# Patient Record
Sex: MALE | Race: WHITE | NOT HISPANIC OR LATINO | Employment: FULL TIME
[De-identification: names, ages, dates, MRNs, and addresses within clinical notes are randomized per-mention and may not be internally consistent; named-entity substitution may affect disease eponyms.]

---

## 2019-11-05 ENCOUNTER — HEALTH MAINTENANCE LETTER (OUTPATIENT)
Age: 29
End: 2019-11-05

## 2020-01-23 ENCOUNTER — VIRTUAL VISIT (OUTPATIENT)
Dept: FAMILY MEDICINE | Facility: OTHER | Age: 30
End: 2020-01-23

## 2020-01-23 NOTE — PROGRESS NOTES
"Date: 2020 15:01:08  Clinician: Daniel Scott  Clinician NPI: [id number]  Patient: Claudy Angulo  Patient : 1990  Patient Address: [address] [place], [place] [address] [address]  Patient Phone: [phone]  Visit Protocol: URI  Patient Summary:  Claudy is a 29 year old ( : 1990 ) male who initiated a Visit for cold, sinus infection, or influenza. When asked the question \"Please sign me up to receive news, health information and promotions from [employer].\", Claudy responded \"No\".    Claudy states his symptoms started suddenly 7-9 days ago.   His symptoms consist of a headache, ear pain, nasal congestion, malaise, rhinitis, a cough, facial pain or pressure, and myalgia. He is experiencing difficulty breathing due to nasal congestion but he is not short of breath.   Symptom details     Nasal secretions: The color of his mucus is green and yellow.    Cough: Claudy coughs a few times an hour and his cough is more bothersome at night. Phlegm comes into his throat when he coughs. He believes the phlegm causes the cough. The color of the phlegm is green and yellow.     Facial pain or pressure: The facial pain or pressure feels worse when bending over or leaning forward.     Headache: He states the headache is moderate (4-6 on a 10 point pain scale).      Claudy denies having sore throat, chills, fever, wheezing, and teeth pain. He also denies double sickening (worsening symptoms after initial improvement), taking antibiotic medication for the symptoms, having recent facial or sinus surgery in the past 60 days, and having a sinus infection within the past year.   Precipitating events  He has not recently been exposed to someone with influenza. Claudy has been in close contact with the following high risk individuals: adults 65 or older, pregnant women, and children under the age of 5.   Pertinent medical history  Weight: 215 lbs   Claudy does not smoke or use smokeless tobacco.   Weight: 215 lbs    MEDICATIONS: No " current medications, ALLERGIES: NKDA  Clinician Response:  Dear Claudy,  Based on the information provided, you have acute bacterial sinusitis, also known as a sinus infection. Sinus infections are caused by bacteria or a virus and symptoms are almost always identical. The difference between the 2 types of infections is timing.  Sinus infections start as viral infections and symptoms improve on their own in about 7 days. If symptoms have not improved after 7 days or have even worsened, a bacterial infection may have developed.  Medication information  I am prescribing:     Amoxicillin 500 mg oral tablet. Take 1 tablet by mouth every 8 hours for 7 days. There are no refills with this prescription.   Self care  The following tips will keep you as comfortable as possible while you recover:     Rest    Drink plenty of water and other liquids    Take a hot shower to loosen congestion    Take a spoonful of honey to reduce your cough     When to seek care  Please be seen in a clinic or urgent care if any of the following occur:     Symptoms do not start to improve after 3 days of treatment    New symptoms develop, or symptoms become worse     It is possible to have an allergic reaction to an antibiotic even if you have not had one in the past. If you notice a new rash, significant swelling, or difficulty breathing, stop taking this medication immediately and go to a clinic or urgent care.   Diagnosis: Acute bacterial sinusitis  Diagnosis ICD: J01.90  Prescription: amoxicillin 500 mg oral tablet 21 tablet, 7 days supply. Take 1 tablet by mouth every 8 hours for 7 days. Refills: 0, Refill as needed: no, Allow substitutions: yes  Pharmacy: [facility] - [phone] - [address] [facility] [place] [address]

## 2020-08-27 ENCOUNTER — TELEPHONE (OUTPATIENT)
Dept: OPTOMETRY | Facility: CLINIC | Age: 30
End: 2020-08-27

## 2020-08-27 ENCOUNTER — OFFICE VISIT (OUTPATIENT)
Dept: OPTOMETRY | Facility: CLINIC | Age: 30
End: 2020-08-27
Payer: COMMERCIAL

## 2020-08-27 DIAGNOSIS — H52.203 MYOPIA OF BOTH EYES WITH ASTIGMATISM: Primary | ICD-10-CM

## 2020-08-27 DIAGNOSIS — H04.129 DRY EYE: ICD-10-CM

## 2020-08-27 DIAGNOSIS — H52.13 MYOPIA OF BOTH EYES WITH ASTIGMATISM: Primary | ICD-10-CM

## 2020-08-27 PROCEDURE — 92004 COMPRE OPH EXAM NEW PT 1/>: CPT | Performed by: OPTOMETRIST

## 2020-08-27 PROCEDURE — 92310 CONTACT LENS FITTING OU: CPT | Performed by: OPTOMETRIST

## 2020-08-27 PROCEDURE — 92015 DETERMINE REFRACTIVE STATE: CPT | Performed by: OPTOMETRIST

## 2020-08-27 ASSESSMENT — REFRACTION_CURRENTRX
OS_SPHERE: -0.75
OS_AXIS: 090
OD_AXIS: 090
OS_BRAND: J&J ACUVUE OASYS 1-DAY FOR ASTIGMATISM
OS_CYLINDER: -0.75
OD_DIAMETER: 14.3
OS_DIAMETER: 14.3
OD_SPHERE: -0.50
OD_CYLINDER: -0.75
OD_BASECURVE: 8.5
OS_BASECURVE: 8.5
OD_BRAND: J&J ACUVUE OASYS 1-DAY FOR ASTIGMATISM

## 2020-08-27 ASSESSMENT — KERATOMETRY
OD_AXISANGLE2_DEGREES: 118
OS_K2POWER_DIOPTERS: 44.87
OS_AXISANGLE_DEGREES: 3
OS_AXISANGLE2_DEGREES: 93
OD_K2POWER_DIOPTERS: 45.25
OD_K1POWER_DIOPTERS: 44.75
OD_AXISANGLE_DEGREES: 28
METHOD_AUTO_MANUAL: AUTOMATED
OS_K1POWER_DIOPTERS: 44.62

## 2020-08-27 ASSESSMENT — REFRACTION_MANIFEST
OD_CYLINDER: +0.75
OD_CYLINDER: +0.75
OS_AXIS: 177
OD_SPHERE: -1.25
OS_SPHERE: -2.00
OD_AXIS: 004
OS_CYLINDER: +0.75
OS_SPHERE: -2.75
OS_AXIS: 165
OD_SPHERE: -1.75
OS_CYLINDER: +1.00
OD_AXIS: 175
METHOD_AUTOREFRACTION: 1

## 2020-08-27 ASSESSMENT — VISUAL ACUITY
OS_SC+: -2
OS_CC: 20/25
OD_CC: 20/20
CORRECTION_TYPE: CONTACTS
OD_SC+: -2
OS_SC: 20/20
OS_CC: 20/20
OD_SC: 20/40
METHOD: SNELLEN - LINEAR
OD_CC: 20/20
OS_CC+: -2

## 2020-08-27 ASSESSMENT — REFRACTION: OS_SPHERE: -1.50

## 2020-08-27 ASSESSMENT — CUP TO DISC RATIO
OD_RATIO: 0.3
OS_RATIO: 0.3

## 2020-08-27 ASSESSMENT — CONF VISUAL FIELD
METHOD: COUNTING FINGERS
OD_NORMAL: 1
OS_NORMAL: 1

## 2020-08-27 ASSESSMENT — EXTERNAL EXAM - LEFT EYE: OS_EXAM: NORMAL

## 2020-08-27 ASSESSMENT — TONOMETRY
IOP_METHOD: APPLANATION
OD_IOP_MMHG: 16
OS_IOP_MMHG: 16

## 2020-08-27 ASSESSMENT — SLIT LAMP EXAM - LIDS
COMMENTS: MEIBOMIAN GLAND DYSFUNCTION
COMMENTS: MEIBOMIAN GLAND DYSFUNCTION

## 2020-08-27 ASSESSMENT — EXTERNAL EXAM - RIGHT EYE: OD_EXAM: NORMAL

## 2020-08-27 NOTE — PROGRESS NOTES
Chief Complaint   Patient presents with     Annual Eye Exam     Contact Lens Evaluation     MARQUIS: 1.5 years wants daily lenses sleeps in lenses   Blur at distance.  Wears cls. Has never found a pair of glasses that he likes.   He reports his eyes hurt at the end of the day wearing cls.   He gets HA's recently     Previous contact lens wearer? Yes: unsure of exact brand- sultana daily lens  Comfort of contact lenses :Not as good as acuvue  Satisfied with current lenses: No        Last Eye Exam: 2019  Dilated Previously: Yes    What are you currently using to see?  glasses and contacts    Distance Vision Acuity: Noticed gradual change in both eyes    Near Vision Acuity: Satisfied with vision while reading and using computer unaided    Eye Comfort: good  Do you use eye drops? : No  Occupation or Hobbies:       Kayy Thornton CPO     Medical, surgical and family histories reviewed and updated 8/27/2020.       OBJECTIVE: See Ophthalmology exam    ASSESSMENT:    ICD-10-CM    1. Myopia of both eyes with astigmatism  H52.13 CONTACT LENS FITTING,BILAT    H52.203         PLAN:   Order trials for contact lens fitting  Prescription for glasses   Artificial tears          Savannah Mandel OD

## 2020-08-27 NOTE — TELEPHONE ENCOUNTER
Brand  Base Curve  Diameter  Sphere  Cylinder  [place]  Dist VA  Near VA    Right  J&J Acuvue Oasys 1-day for astigmatism  8.5  14.3  -0.50  -0.75  090      Left  J&J Acuvue Oasys 1-day for astigmatism  8.5  14.3  -0.75  -0.75  090        Ordered trials for dispense appointment.    Kayy Thornton on 8/27/2020 at 10:50 AM

## 2020-08-27 NOTE — PATIENT INSTRUCTIONS
We will order trials and set up an appointment to check them/ make any adjustments before finalizing contact lens prescription     You may use rewetting drops such as blink or refresh contacts over lenses   and artificial tears when lenses are out    There are over the counter drops that work well and may be used up to 4 x daily when lenses are out if your eyes are dry. ( systane , refresh, thera tears) If you need more than 4 drops daily, use a preservative free product which come in individual vials and may be used for 24 hours until finished and discarded.     If you are in not in dailies, consider clear care solution if still having problems and reduce wear time to 10-12 hours       DRY EYE TREATMENT    I recommend using artificial tears for your dry eye. There are over the counter drops that work well and may be used up to 4x daily. ( systane balance, blink, refresh optive, soothe xp)   If you need more than 4 drops daily, use a preservative free product which come in individual vials and may be used for 24 hours and discarded.   The more severe the dry eye, the more frequent instillation of artificial tears  that are needed to reduce irritation/ inflammation. (Sometimes every 1-2 hours for a couple of days).  You can also add a lubricating ointment in the lower lid at bedtime. ( over the counter refresh pm, genteal gel, lacrilube etc.)    Artificial tears work best as a preventative and not as well after your eyes are starting to bother you and/ or are red.  It may take 4- 6 weeks of using the drops before you notice improvement.  If after that time you are still having problems schedule an appointment for an evaluation to discuss different treatments.    Dry eyes are a chronic condition and you may have more symptoms at certain times of the year.      Additional recommended treatment:  Warm compresses once to twice daily for 5-10 minutes    Directions for warm soaks  There are few methods for hot compresses.  Moisten a washcloth with hot water, or microwave for 10 seconds, being careful to not get the cloth too hot.   Then put the washcloth onto your eyelids for 5 minutes. It will cool quickly so a rice pack or eyemask that can be heated and laid on top of the washcloth will help retain the heat.          Omega 3 fatty acid supplements taken 1-2x daily  Recommend  at least  2000mg omega 3  800 EPA  600 DHA    Blink regularly  Stay hydrated  Increase humidity  Wear sunglasses   Avoid direct exposure to forced air--turn air vents in the car away and keep fans from blowing directly on your face      Recommend no extended wear   Could consider lasik if no change in 2 years

## 2020-08-27 NOTE — LETTER
8/27/2020         RE: Claudy Angulo  [address] [facility] [address]        Dear Colleague,    Thank you for referring your patient, Claudy Angulo, to the [facility]. Please see a copy of my visit note below.    Chief Complaint   Patient presents with     Annual Eye Exam     Contact Lens Evaluation     MARQUIS: 1.5 years wants daily lenses sleeps in lenses   Blur at distance.  Wears cls. Has never found a pair of glasses that he likes.   He reports his eyes hurt at the end of the day wearing cls.   He gets HA's recently     Previous contact lens wearer? Yes: unsure of exact brand- sultana daily lens  Comfort of contact lenses :Not as good as acuvue  Satisfied with current lenses: No        Last Eye Exam: 2019  Dilated Previously: Yes    What are you currently using to see?  glasses and contacts    Distance Vision Acuity: Noticed gradual change in both eyes    Near Vision Acuity: Satisfied with vision while reading and using computer unaided    Eye Comfort: good  Do you use eye drops? : No  Occupation or Hobbies:       Kayy Thornton CPO     Medical, surgical and family histories reviewed and updated 8/27/2020.       OBJECTIVE: See Ophthalmology exam    ASSESSMENT:    ICD-10-CM    1. Myopia of both eyes with astigmatism  H52.13 CONTACT LENS FITTING,BILAT    H52.203         PLAN:   Order trials for contact lens fitting  Prescription for glasses   Artificial tears          Savannah Mandel OD     Again, thank you for allowing me to participate in the care of your patient.        Sincerely,        Savannah Mandel, OD

## 2020-09-08 ENCOUNTER — OFFICE VISIT (OUTPATIENT)
Dept: OPTOMETRY | Facility: CLINIC | Age: 30
End: 2020-09-08
Payer: COMMERCIAL

## 2020-09-08 DIAGNOSIS — H52.13 MYOPIA OF BOTH EYES WITH ASTIGMATISM: Primary | ICD-10-CM

## 2020-09-08 DIAGNOSIS — H52.203 MYOPIA OF BOTH EYES WITH ASTIGMATISM: Primary | ICD-10-CM

## 2020-09-08 PROCEDURE — 92499 UNLISTED OPH SVC/PROCEDURE: CPT | Performed by: OPTOMETRIST

## 2020-09-08 PROCEDURE — 99207 ZZC NO CHARGE LOS: CPT | Performed by: OPTOMETRIST

## 2020-09-08 ASSESSMENT — REFRACTION_CURRENTRX
OS_AXIS: 090
OD_DIAMETER: 14.3
OD_BASECURVE: 8.5
OD_CYLINDER: -0.75
OS_BASECURVE: 8.5
OD_AXIS: 090
OS_DIAMETER: 14.3
OD_SPHERE: -0.50
OS_SPHERE: -0.75
OS_CYLINDER: -0.75

## 2020-09-08 NOTE — PROGRESS NOTES
Chief Complaint   Patient presents with     Dispense      Brand  Base Curve  Diameter  Sphere  Cylinder  [place]  Dist VA  Near VA    Right  J&J Acuvue Oasys 1-day for astigmatism  8.5  14.3  -0.50  -0.75  090  20/20  20/20    Left  J&J Acuvue Oasys 1-day for astigmatism  8.5  14.3  -0.75  -0.75  090  20/25-2  20/20        Contact lenses dispensed    Kayy Thornton CPO      OBJECTIVE: See Ophthalmology exam     ASSESSMENT:    ICD-10-CM    1. Myopia of both eyes with astigmatism  H52.13 CONTACT LENS CHECK    H52.203       PLAN:    Savannah Mandel OD

## 2020-09-08 NOTE — LETTER
9/8/2020         RE: Claudy Angulo  [address] [facility] [facility]  [place] [address]        Dear Colleague,    Thank you for referring your patient, Claudy Angulo, to the [facility]. Please see a copy of my visit note below.    Chief Complaint   Patient presents with     Dispense      Brand  Base Curve  Diameter  Sphere  Cylinder  [place]  Dist VA  Near VA    Right  J&J Acuvue Oasys 1-day for astigmatism  8.5  14.3  -0.50  -0.75  090  20/20  20/20    Left  J&J Acuvue Oasys 1-day for astigmatism  8.5  14.3  -0.75  -0.75  090  20/25-2  20/20        Contact lenses dispensed    Kayy Thornton CPO      OBJECTIVE: See Ophthalmology exam     ASSESSMENT:    ICD-10-CM    1. Myopia of both eyes with astigmatism  H52.13 CONTACT LENS CHECK    H52.203       PLAN:    Savannah Mandel OD     Again, thank you for allowing me to participate in the care of your patient.        Sincerely,        Savannah Mandel, OD

## 2020-09-23 ENCOUNTER — ALLIED HEALTH/NURSE VISIT (OUTPATIENT)
Dept: NURSING | Facility: CLINIC | Age: 30
End: 2020-09-23
Payer: COMMERCIAL

## 2020-09-23 DIAGNOSIS — Z23 NEED FOR PROPHYLACTIC VACCINATION AND INOCULATION AGAINST INFLUENZA: Primary | ICD-10-CM

## 2020-09-23 PROCEDURE — 90686 IIV4 VACC NO PRSV 0.5 ML IM: CPT

## 2020-09-23 PROCEDURE — 90471 IMMUNIZATION ADMIN: CPT

## 2020-11-22 ENCOUNTER — HEALTH MAINTENANCE LETTER (OUTPATIENT)
Age: 30
End: 2020-11-22

## 2021-02-18 ENCOUNTER — OFFICE VISIT (OUTPATIENT)
Dept: PEDIATRICS | Facility: CLINIC | Age: 31
End: 2021-02-18
Payer: COMMERCIAL

## 2021-02-18 VITALS
BODY MASS INDEX: 28.08 KG/M2 | TEMPERATURE: 97.7 F | WEIGHT: 211.9 LBS | HEIGHT: 73 IN | OXYGEN SATURATION: 100 % | DIASTOLIC BLOOD PRESSURE: 78 MMHG | HEART RATE: 71 BPM | SYSTOLIC BLOOD PRESSURE: 108 MMHG

## 2021-02-18 DIAGNOSIS — S89.92XD LEFT KNEE INJURY, SUBSEQUENT ENCOUNTER: ICD-10-CM

## 2021-02-18 DIAGNOSIS — Z00.00 ROUTINE GENERAL MEDICAL EXAMINATION AT A HEALTH CARE FACILITY: Primary | ICD-10-CM

## 2021-02-18 PROCEDURE — 99213 OFFICE O/P EST LOW 20 MIN: CPT | Mod: 25 | Performed by: STUDENT IN AN ORGANIZED HEALTH CARE EDUCATION/TRAINING PROGRAM

## 2021-02-18 PROCEDURE — 99385 PREV VISIT NEW AGE 18-39: CPT | Mod: GC | Performed by: STUDENT IN AN ORGANIZED HEALTH CARE EDUCATION/TRAINING PROGRAM

## 2021-02-18 ASSESSMENT — ENCOUNTER SYMPTOMS
MYALGIAS: 0
EYE PAIN: 0
HEMATOCHEZIA: 0
DIARRHEA: 0
CONSTIPATION: 0
DYSURIA: 0
FEVER: 0
DIZZINESS: 0
PALPITATIONS: 0
FREQUENCY: 0
HEADACHES: 0
JOINT SWELLING: 0
SHORTNESS OF BREATH: 0
ARTHRALGIAS: 1
HEARTBURN: 0
ABDOMINAL PAIN: 0
PARESTHESIAS: 0
NAUSEA: 0
WEAKNESS: 0
COUGH: 0
NERVOUS/ANXIOUS: 0
CHILLS: 0
SORE THROAT: 0
HEMATURIA: 0

## 2021-02-18 ASSESSMENT — MIFFLIN-ST. JEOR: SCORE: 1983.66

## 2021-02-18 NOTE — PATIENT INSTRUCTIONS
1. Follow up with physical therapy.  2. Should knee continue to bother you, then plan to call us to discuss referral to sports medicine or orthopedic surgery.  3. Plan to follow up in 1 year.    Preventive Health Recommendations  Male Ages 26 - 39    Yearly exam:             See your health care provider every year in order to  o   Review health changes.   o   Discuss preventive care.    o   Review your medicines if your doctor has prescribed any.    You should be tested each year for STDs (sexually transmitted diseases), if you re at risk.     After age 35, talk to your provider about cholesterol testing. If you are at risk for heart disease, have your cholesterol tested at least every 5 years.     If you are at risk for diabetes, you should have a diabetes test (fasting glucose).  Shots: Get a flu shot each year. Get a tetanus shot every 10 years.     Nutrition:    Eat at least 5 servings of fruits and vegetables daily.     Eat whole-grain bread, whole-wheat pasta and brown rice instead of white grains and rice.     Get adequate Calcium and Vitamin D.     Lifestyle    Exercise for at least 150 minutes a week (30 minutes a day, 5 days a week). This will help you control your weight and prevent disease.     Limit alcohol to one drink per day.     No smoking.     Wear sunscreen to prevent skin cancer.     See your dentist every six months for an exam and cleaning.

## 2021-02-18 NOTE — PROGRESS NOTES
"SUBJECTIVE:   CC: Mr. Claudy Angulo is a 30-year-old man who presents for preventative health visit.    1. Left knee injury  - Took a helmet to lateral knee (\"from the outside in\") while playing football at age 18.  - On crutches for a couple weeks.  - Reportedly had an X-ray and MRI (at [place]).  - Did not see physical therapy, sports medicine, or orthopedic surgery.  - Has intermittent pain, swelling, and bruising from posterior thigh down calf.  - Pain was last exacerbated by running down a hill after his son who was sledding - experienced \"shocking\" pain after which knee locked up.  - Pain is this bad approximately 3-4x/yr.  - Sitting for a while also locks up knee.  - Consistently tries to be active; went to gym daily for ~5 years; curretly trying to do enough to strengthen his left knee including using a Peloton as of late.  - Amenable to physical therapy.    - Often, \"all joints hurt - hands, feet, ankles, shoulders, wrists.\"  - Seems to worsen every year.  - By the end of day, \"feels like you want to crack your whole body.\"  - Takes ibuprofen every couple of days.    - Denies any depression or anxiety.  - Reports work can sometimes get stressful but able to manage.    - Received influenza vaccine on 09/23/2020  - Received last TDaP on 05/24/2018    -  to wife who works as a nurse here.  - Has 2 kids (Aquilino and Homer, ages 5 months and 2.5 years).  - Works as a .    Patient has been advised of split billing requirements and indicates understanding:Yes  Healthy Habits:     Getting at least 3 servings of Calcium per day:  NO    Bi-annual eye exam:  Yes    Dental care twice a year:  Yes    Sleep apnea or symptoms of sleep apnea:  None    Diet:  Regular (no restrictions)    Frequency of exercise:  2-3 days/week    Duration of exercise:  30-45 minutes    Taking medications regularly:  Yes    Medication side effects:  Not applicable    PHQ-2 Total Score: 0    Diet:  - Breakfast: " "Banana or protein bar  - Lunch: [employer] or salad; [employer] or [employer]  - Dinner: \"Home-made meals\" v. leftovers  - Coffee: 4-5 cups/days  - Rarely drinks an energy drink    Today's PHQ-2 Score:   PHQ-2 ( 1999 Pfizer) 2/18/2021   Q1: Little interest or pleasure in doing things 0   Q2: Feeling down, depressed or hopeless 0   PHQ-2 Score 0   Q1: Little interest or pleasure in doing things Not at all   Q2: Feeling down, depressed or hopeless Not at all   PHQ-2 Score 0     Abuse: Current or Past(Physical, Sexual or Emotional)- No  Do you feel safe in your environment? Yes    Have you ever done Advance Care Planning? (For example, a Health Directive, POLST, or a discussion with a medical provider or your loved ones about your wishes): No, advance care planning information given to patient to review.  Patient declined advance care planning discussion at this time.    Social History     Tobacco Use     Smoking status: Never Smoker     Smokeless tobacco: Never Used   Substance Use Topics     Alcohol use: No   - Never smoker  - No illicit drug use.  - 2 bottles of beer (or drinks of whiskey)/wk    If you drink alcohol do you typically have >3 drinks per day or >7 drinks per week? No    Alcohol Use 2/18/2021   Prescreen: >3 drinks/day or >7 drinks/week? No   Prescreen: >3 drinks/day or >7 drinks/week? -     Last PSA: No results found for: PSA    Reviewed orders with patient. Reviewed health maintenance and updated orders accordingly - Yes    Reviewed and updated as needed this visit by clinical staff  Tobacco  Allergies    Med Hx  Surg Hx  Fam Hx  Soc Hx        Family History  - Father and paternal grandmother have type 1 diabetes mellitus.  - No family history of hypertension or thyroid disease.    Reviewed and updated as needed this visit by Provider                History reviewed. No pertinent past medical history.   History reviewed. No pertinent surgical history.    Review of Systems   Constitutional: Negative " "for chills and fever.   HENT: Negative for congestion, ear pain, hearing loss and sore throat.    Eyes: Negative for pain and visual disturbance.   Respiratory: Negative for cough and shortness of breath.    Cardiovascular: Negative for chest pain, palpitations and peripheral edema.   Gastrointestinal: Negative for abdominal pain, constipation, diarrhea, heartburn, hematochezia and nausea.   Genitourinary: Negative for discharge, dysuria, frequency, genital sores, hematuria, impotence and urgency.   Musculoskeletal: Positive for arthralgias. Negative for joint swelling and myalgias.   Skin: Negative for rash.   Neurological: Negative for dizziness, weakness, headaches and paresthesias.   Psychiatric/Behavioral: Negative for mood changes. The patient is not nervous/anxious.      OBJECTIVE:   /78 (BP Location: Right arm, Patient Position: Sitting, Cuff Size: Adult Large)   Pulse 71   Temp 97.7  F (36.5  C) (Tympanic)   Ht 1.86 m (6' 1.23\")   Wt 96.1 kg (211 lb 14.4 oz)   SpO2 100%   BMI 27.78 kg/m      Physical Exam  GENERAL: healthy, muscular build, alert, no distress  EYES: eyes grossly normal to inspection, PER, conjunctivae and sclerae normal  NECK: no adenopathy, no asymmetry, masses, or scars and thyroid normal to palpation  RESP: lungs clear to auscultation - no rales, rhonchi, or wheezes  CV: regular rate and rhythm, normal S1 S2, no S3 or S4, no murmur, click or rub, no peripheral edema and peripheral pulses strong  ABDOMEN: soft, non-tender, no hepatosplenomegaly, no masses and bowel sounds normal  MS: no gross musculoskeletal defects noted, no edema  SKIN: 2 small 1-2 cm areas of bruising without any associated tenderness or swelling in the left popliteal fossa  NEURO: normal strength and tone, mentation intact and speech normal  PSYCH: mentation appears normal, affect normal/bright    Diagnostic Test Results:  Labs reviewed in Epic    ASSESSMENT/PLAN:   Mr. Claudy Angulo is a healthy " "30-year-old man who presents for preventative health visit. Hemodynamically, VS WNLs. BMI 27.8 kg/m^2 (elevated due to patient's muscular build). Clinically, patient's blood pressure is normal; his weight is appropriate; he is up to date on immunizations (next TDaP due 05/2028); he is a never smoker; and his alcohol intake is minimal. He declined STD testing and does not otherwise require any blood work at this time. I suspect his intermittent left knee pain to be 2/2 acute-on-chronic inflammation from physical activity exacerbating an existing injury (e.g., partial medial meniscal tear v. hamstring strain). We counseled the patient on physical therapy with plan to consider referral to sports medicine v. orthopedic surgery and/or repeat imaging should the patient's knee pain persist or worsen.      ICD-10-CM    1. Routine general medical examination at a health care facility  Z00.00 REVIEW OF HEALTH MAINTENANCE PROTOCOL ORDERS   2. Left knee injury, subsequent encounter  S89.92XD PHYSICAL THERAPY REFERRAL     Patient has been advised of split billing requirements and indicates understanding: No   COUNSELING:   Reviewed preventive health counseling, as reflected in patient instructions       Regular exercise       Healthy diet/nutrition       Alcohol Use     Estimated body mass index is 27.78 kg/m  as calculated from the following:    Height as of this encounter: 1.86 m (6' 1.23\").    Weight as of this encounter: 96.1 kg (211 lb 14.4 oz).     Weight management plan noted, stable and monitoring    He reports that he has never smoked. He has never used smokeless tobacco.    Counseling Resources:  ATP IV Guidelines  Pooled Cohorts Equation Calculator  FRAX Risk Assessment  ICSI Preventive Guidelines  Dietary Guidelines for Americans, 2010  USDA's MyPlate  ASA Prophylaxis  Lung CA Screening    Mauri Nuñez MD  PGY-3 Internal Medicine/Pediatrics  Pager [phone]  Thursday 02/18/2021  [facility]    Patient staffed with the attending physician, Dr. Soria.

## 2021-03-28 ENCOUNTER — IMMUNIZATION (OUTPATIENT)
Dept: NURSING | Facility: CLINIC | Age: 31
End: 2021-03-28
Payer: COMMERCIAL

## 2021-03-28 PROCEDURE — 91301 PR COVID VAC MODERNA 100 MCG/0.5 ML IM: CPT

## 2021-03-28 PROCEDURE — 0011A PR COVID VAC MODERNA 100 MCG/0.5 ML IM: CPT

## 2021-04-24 ENCOUNTER — IMMUNIZATION (OUTPATIENT)
Dept: NURSING | Facility: CLINIC | Age: 31
End: 2021-04-24
Attending: INTERNAL MEDICINE
Payer: COMMERCIAL

## 2021-04-24 PROCEDURE — 91301 PR COVID VAC MODERNA 100 MCG/0.5 ML IM: CPT

## 2021-04-24 PROCEDURE — 0012A PR COVID VAC MODERNA 100 MCG/0.5 ML IM: CPT

## 2021-08-17 ENCOUNTER — MYC MEDICAL ADVICE (OUTPATIENT)
Dept: OPTOMETRY | Facility: CLINIC | Age: 31
End: 2021-08-17

## 2021-08-17 ENCOUNTER — TELEPHONE (OUTPATIENT)
Dept: OPTOMETRY | Facility: CLINIC | Age: 31
End: 2021-08-17

## 2021-08-17 NOTE — TELEPHONE ENCOUNTER
Sharon/ wife works upstairs here at [place]. She called to get the process started for Claudy to look into having lasik. Please advise where he should start and who you would recommend. Sending on [employer] is fine. thanks

## 2021-09-13 ENCOUNTER — IMMUNIZATION (OUTPATIENT)
Dept: PEDIATRICS | Facility: CLINIC | Age: 31
End: 2021-09-13
Payer: COMMERCIAL

## 2021-09-13 DIAGNOSIS — Z23 NEED FOR PROPHYLACTIC VACCINATION AND INOCULATION AGAINST INFLUENZA: Primary | ICD-10-CM

## 2021-09-13 PROCEDURE — 90686 IIV4 VACC NO PRSV 0.5 ML IM: CPT

## 2021-09-13 PROCEDURE — 99207 PR NO CHARGE NURSE ONLY: CPT

## 2021-09-13 PROCEDURE — 90471 IMMUNIZATION ADMIN: CPT

## 2021-09-13 NOTE — PROGRESS NOTES
Prior to immunization administration, verified patients identity using patient s name and date of birth. Please see Immunization Activity for additional information.     Screening Questionnaire for Adult Immunization    Are you sick today?   No   Do you have allergies to medications, food, a vaccine component or latex?   No   Have you ever had a serious reaction after receiving a vaccination?   No   Do you have a long-term health problem with heart, lung, kidney, or metabolic disease (e.g., diabetes), asthma, a blood disorder, no spleen, complement component deficiency, a cochlear implant, or a spinal fluid leak?  Are you on long-term aspirin therapy?   No   Do you have cancer, leukemia, HIV/AIDS, or any other immune system problem?   No   Do you have a parent, brother, or sister with an immune system problem?   No   In the past 3 months, have you taken medications that affect  your immune system, such as prednisone, other steroids, or anticancer drugs; drugs for the treatment of rheumatoid arthritis, Crohn s disease, or psoriasis; or have you had radiation treatments?   No   Have you had a seizure, or a brain or other nervous system problem?   No   During the past year, have you received a transfusion of blood or blood    products, or been given immune (gamma) globulin or antiviral drug?   No   For women: Are you pregnant or is there a chance you could become       pregnant during the next month?   No   Have you received any vaccinations in the past 4 weeks?   No     Immunization questionnaire answers were all negative.        Per orders of Cheri Feldman, injection of Flu given by Laura Sol CMA. Patient instructed to remain in clinic for 15 minutes afterwards, and to report any adverse reaction to me immediately.       Screening performed by Laura Sol CMA on 9/13/2021 at 5:13 PM.

## 2021-12-02 ENCOUNTER — IMMUNIZATION (OUTPATIENT)
Dept: PEDIATRICS | Facility: CLINIC | Age: 31
End: 2021-12-02
Payer: COMMERCIAL

## 2021-12-02 DIAGNOSIS — Z23 HIGH PRIORITY FOR 2019-NCOV VACCINE: Primary | ICD-10-CM

## 2021-12-02 PROCEDURE — 91306 COVID-19,PF,MODERNA (18+ YRS BOOSTER .25ML): CPT

## 2021-12-02 PROCEDURE — 0064A COVID-19,PF,MODERNA (18+ YRS BOOSTER .25ML): CPT

## 2022-04-30 ENCOUNTER — HEALTH MAINTENANCE LETTER (OUTPATIENT)
Age: 32
End: 2022-04-30

## 2022-09-02 ENCOUNTER — PATIENT SELF-TRIAGE (OUTPATIENT)
Dept: URGENT CARE | Facility: CLINIC | Age: 32
End: 2022-09-02
Payer: COMMERCIAL

## 2022-09-02 DIAGNOSIS — Z20.822 CLOSE EXPOSURE TO 2019 NOVEL CORONAVIRUS: ICD-10-CM

## 2022-09-02 LAB — SARS-COV-2 RNA RESP QL NAA+PROBE: NEGATIVE

## 2022-09-02 PROCEDURE — U0005 INFEC AGEN DETEC AMPLI PROBE: HCPCS | Performed by: FAMILY MEDICINE

## 2022-09-02 PROCEDURE — U0003 INFECTIOUS AGENT DETECTION BY NUCLEIC ACID (DNA OR RNA); SEVERE ACUTE RESPIRATORY SYNDROME CORONAVIRUS 2 (SARS-COV-2) (CORONAVIRUS DISEASE [COVID-19]), AMPLIFIED PROBE TECHNIQUE, MAKING USE OF HIGH THROUGHPUT TECHNOLOGIES AS DESCRIBED BY CMS-2020-01-R: HCPCS | Performed by: FAMILY MEDICINE

## 2022-10-07 ENCOUNTER — IMMUNIZATION (OUTPATIENT)
Dept: PEDIATRICS | Facility: CLINIC | Age: 32
End: 2022-10-07
Payer: COMMERCIAL

## 2022-10-07 DIAGNOSIS — Z23 HIGH PRIORITY FOR 2019-NCOV VACCINE: ICD-10-CM

## 2022-10-07 DIAGNOSIS — Z23 NEED FOR PROPHYLACTIC VACCINATION AND INOCULATION AGAINST INFLUENZA: Primary | ICD-10-CM

## 2022-10-07 PROCEDURE — 90471 IMMUNIZATION ADMIN: CPT

## 2022-10-07 PROCEDURE — 0134A COVID-19,PF,MODERNA BIVALENT: CPT

## 2022-10-07 PROCEDURE — 90686 IIV4 VACC NO PRSV 0.5 ML IM: CPT

## 2022-10-07 PROCEDURE — 91313 COVID-19,PF,MODERNA BIVALENT: CPT

## 2022-10-07 PROCEDURE — 99207 PR NO CHARGE NURSE ONLY: CPT

## 2022-11-07 ENCOUNTER — VIRTUAL VISIT (OUTPATIENT)
Dept: PEDIATRICS | Facility: CLINIC | Age: 32
End: 2022-11-07
Payer: COMMERCIAL

## 2022-11-07 DIAGNOSIS — G47.00 INSOMNIA, UNSPECIFIED TYPE: ICD-10-CM

## 2022-11-07 DIAGNOSIS — F41.9 ANXIETY: Primary | ICD-10-CM

## 2022-11-07 PROCEDURE — 99214 OFFICE O/P EST MOD 30 MIN: CPT | Mod: 95 | Performed by: INTERNAL MEDICINE

## 2022-11-07 RX ORDER — HYDROXYZINE HYDROCHLORIDE 25 MG/1
25 TABLET, FILM COATED ORAL 3 TIMES DAILY PRN
Qty: 60 TABLET | Refills: 1 | Status: SHIPPED | OUTPATIENT
Start: 2022-11-07 | End: 2024-09-27

## 2022-11-07 ASSESSMENT — ANXIETY QUESTIONNAIRES
1. FEELING NERVOUS, ANXIOUS, OR ON EDGE: NEARLY EVERY DAY
6. BECOMING EASILY ANNOYED OR IRRITABLE: NOT AT ALL
7. FEELING AFRAID AS IF SOMETHING AWFUL MIGHT HAPPEN: NOT AT ALL
IF YOU CHECKED OFF ANY PROBLEMS ON THIS QUESTIONNAIRE, HOW DIFFICULT HAVE THESE PROBLEMS MADE IT FOR YOU TO DO YOUR WORK, TAKE CARE OF THINGS AT HOME, OR GET ALONG WITH OTHER PEOPLE: SOMEWHAT DIFFICULT
5. BEING SO RESTLESS THAT IT IS HARD TO SIT STILL: MORE THAN HALF THE DAYS
GAD7 TOTAL SCORE: 12
4. TROUBLE RELAXING: NEARLY EVERY DAY
7. FEELING AFRAID AS IF SOMETHING AWFUL MIGHT HAPPEN: NOT AT ALL
3. WORRYING TOO MUCH ABOUT DIFFERENT THINGS: NEARLY EVERY DAY
GAD7 TOTAL SCORE: 12
8. IF YOU CHECKED OFF ANY PROBLEMS, HOW DIFFICULT HAVE THESE MADE IT FOR YOU TO DO YOUR WORK, TAKE CARE OF THINGS AT HOME, OR GET ALONG WITH OTHER PEOPLE?: SOMEWHAT DIFFICULT
2. NOT BEING ABLE TO STOP OR CONTROL WORRYING: SEVERAL DAYS
GAD7 TOTAL SCORE: 12

## 2022-11-07 ASSESSMENT — PATIENT HEALTH QUESTIONNAIRE - PHQ9
10. IF YOU CHECKED OFF ANY PROBLEMS, HOW DIFFICULT HAVE THESE PROBLEMS MADE IT FOR YOU TO DO YOUR WORK, TAKE CARE OF THINGS AT HOME, OR GET ALONG WITH OTHER PEOPLE: VERY DIFFICULT
SUM OF ALL RESPONSES TO PHQ QUESTIONS 1-9: 12
SUM OF ALL RESPONSES TO PHQ QUESTIONS 1-9: 12

## 2022-11-07 NOTE — PROGRESS NOTES
Answers for HPI/ROS submitted by the patient on 11/7/2022  If you checked off any problems, how difficult have these problems made it for you to do your work, take care of things at home, or get along with other people?: Very difficult  PHQ9 TOTAL SCORE: 12  KALI 7 TOTAL SCORE: 12  What is the reason for your visit today? : Stress and being anxious  How many servings of fruits and vegetables do you eat daily?: 0-1  On average, how many sweetened beverages do you drink each day (Examples: soda, juice, sweet tea, etc.  Do NOT count diet or artificially sweetened beverages)?: 1  How many minutes a day do you exercise enough to make your heart beat faster?: 9 or less  How many days a week do you exercise enough to make your heart beat faster?: 3 or less  How many days per week do you miss taking your medication?: 0      Claudy is a 32 year old who is being evaluated via a billable video visit.        Assessment & Plan     (F41.9) Anxiety  (primary encounter diagnosis)  Comment:   32-year-old presents today for evaluation of anxiety symptoms and mild depression progressive over the past 6 months.  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  Does tend to ruminate at night leading to poor sleep/often wakes at 3 am.  Persistent problems with poor sleep has contributed to persistent mood issues daily.  Today is requesting medication for anxiety.  Discussed the pros and cons of anxiety medication-options: SSRI, hydroxyzine, gabapentin.  Start hydroxyzine as needed.  Patient agrees to meet with counseling-referral.  Also recommended daily activity regimen such as walking 20-30 minutes most days per week for stress management.  Return for clinic follow-up 4 to 6 weeks.  Medication side effects reviewed.  Plan: Adult Mental Health  Referral,         hydrOXYzine (ATARAX) 25 MG tablet          (G47.00) Insomnia, unspecified type  Comment:   Plan: Sleep maintenance insomnia, associated with anxiety  symptoms.  Managing early morning wakening- recommended getting out of bed/and sitting or resting quietly in another room until drowsiness recurs then returned to bed.  Sleep hygiene-instructed to avoid screen time before bed and if he wakes at night.  Okay to try melatonin.  Plans to try hydroxyzine each evening as needed.    [id number]}     Depression Screening Follow Up    PHQ 11/7/2022   PHQ-9 Total Score 12   Q9: Thoughts of better off dead/self-harm past 2 weeks Not at all     Last PHQ-9 11/7/2022   1.  Little interest or pleasure in doing things 0   2.  Feeling down, depressed, or hopeless 1   3.  Trouble falling or staying asleep, or sleeping too much 2   4.  Feeling tired or having little energy 2   5.  Poor appetite or overeating 3   6.  Feeling bad about yourself 0   7.  Trouble concentrating 2   8.  Moving slowly or restless 2   Q9: Thoughts of better off dead/self-harm past 2 weeks 0   PHQ-9 Total Score 12       Return in about 4 weeks (around 12/5/2022) for follow-up visit.    Antelmo Schneider MD  [facility] KAVITHA Loco is a 32 year old, presenting for the following health issues:  No chief complaint on file.      HPI       PHQ-9 score:    PHQ 11/7/2022   PHQ-9 Total Score 12   Q9: Thoughts of better off dead/self-harm past 2 weeks Not at all     Anxiety past few months  Worse past 6 months  Mainly job stress--car sales  Works a lot of hours  Sleep has been worse--trouble falling a sleep, waking at night thinking  Wakes at 3am thinking, estimates he Sleeps 5-6 hrs per night    Patient Active Problem List   Diagnosis     Benign neoplasm of skin of trunk, except scrotum     CARDIOVASCULAR SCREENING; LDL GOAL LESS THAN 130     ADD (attention deficit disorder with hyperactivity)             Review of Systems         Objective           Vitals:  No vitals were obtained today due to virtual visit.    Physical Exam   GENERAL: Healthy, alert and no distress  EYES: Eyes grossly normal to  inspection.  No discharge or erythema, or obvious scleral/conjunctival abnormalities.  RESP: No audible wheeze, cough, or visible cyanosis.  No visible retractions or increased work of breathing.    SKIN: Visible skin clear. No significant rash, abnormal pigmentation or lesions.  NEURO: Cranial nerves grossly intact.  Mentation and speech appropriate for age.  PSYCH: Mentation appears normal, affect normal/bright, judgement and insight intact, normal speech and appearance well-groomed.                Video-Visit Details    Video Start Time: 4:36 PM    Type of service:  Video Visit    Video End Time:4:55 PM    Originating Location (pt. Location): Home        Distant Location (provider location):  On-site    Platform used for Video Visit: [employer]

## 2022-12-08 ENCOUNTER — OFFICE VISIT (OUTPATIENT)
Dept: PEDIATRICS | Facility: CLINIC | Age: 32
End: 2022-12-08
Payer: COMMERCIAL

## 2022-12-08 VITALS
SYSTOLIC BLOOD PRESSURE: 120 MMHG | HEART RATE: 70 BPM | RESPIRATION RATE: 16 BRPM | BODY MASS INDEX: 27.43 KG/M2 | WEIGHT: 209.2 LBS | OXYGEN SATURATION: 97 % | DIASTOLIC BLOOD PRESSURE: 80 MMHG | TEMPERATURE: 98.4 F

## 2022-12-08 DIAGNOSIS — F41.9 ANXIETY: Primary | ICD-10-CM

## 2022-12-08 PROCEDURE — 99213 OFFICE O/P EST LOW 20 MIN: CPT | Performed by: INTERNAL MEDICINE

## 2022-12-08 RX ORDER — GABAPENTIN 100 MG/1
100 CAPSULE ORAL AT BEDTIME
Qty: 30 CAPSULE | Refills: 1 | Status: SHIPPED | OUTPATIENT
Start: 2022-12-08 | End: 2023-10-09

## 2022-12-08 ASSESSMENT — ANXIETY QUESTIONNAIRES
5. BEING SO RESTLESS THAT IT IS HARD TO SIT STILL: SEVERAL DAYS
6. BECOMING EASILY ANNOYED OR IRRITABLE: NOT AT ALL
GAD7 TOTAL SCORE: 6
8. IF YOU CHECKED OFF ANY PROBLEMS, HOW DIFFICULT HAVE THESE MADE IT FOR YOU TO DO YOUR WORK, TAKE CARE OF THINGS AT HOME, OR GET ALONG WITH OTHER PEOPLE?: NOT DIFFICULT AT ALL
4. TROUBLE RELAXING: MORE THAN HALF THE DAYS
2. NOT BEING ABLE TO STOP OR CONTROL WORRYING: SEVERAL DAYS
3. WORRYING TOO MUCH ABOUT DIFFERENT THINGS: SEVERAL DAYS
1. FEELING NERVOUS, ANXIOUS, OR ON EDGE: SEVERAL DAYS
IF YOU CHECKED OFF ANY PROBLEMS ON THIS QUESTIONNAIRE, HOW DIFFICULT HAVE THESE PROBLEMS MADE IT FOR YOU TO DO YOUR WORK, TAKE CARE OF THINGS AT HOME, OR GET ALONG WITH OTHER PEOPLE: NOT DIFFICULT AT ALL
7. FEELING AFRAID AS IF SOMETHING AWFUL MIGHT HAPPEN: NOT AT ALL
7. FEELING AFRAID AS IF SOMETHING AWFUL MIGHT HAPPEN: NOT AT ALL
GAD7 TOTAL SCORE: 6
GAD7 TOTAL SCORE: 6

## 2022-12-08 ASSESSMENT — PAIN SCALES - GENERAL: PAINLEVEL: NO PAIN (0)

## 2022-12-08 NOTE — PROGRESS NOTES
Assessment & Plan     (F41.9) Anxiety  (primary encounter diagnosis)  Comment: Anxiety follow-up.  Since most recent visit, has started hydroxyzine as needed.  Hydroxyzine does help with sleep at night but does note some residual somnolence each morning.  Stressors: Primarily work-related/anticipating a promotion in the next few months but not sure if it will happen.  Feels that he still struggles with some anxiety at night and ruminating.  Recommended a trial of gabapentin 100 mg each evening both for anxiety and sleep, follow-up in 4 weeks.  If no improvement or not tolerating gabapentin discussed trial of SSRI- sertraline or citalopram.  Plan: gabapentin (NEURONTIN) 100 MG capsule            Return in about 4 weeks (around 1/5/2023) for follow-up visit.    Antelmo Schneider MD  [facility] KAVITHA Loco is a 32 year old, presenting for the following health issues:      History of Present Illness       Mental Health Follow-up:  Patient presents to follow-up on Anxiety.    Patient's anxiety since last visit has been:  Better  The patient is not having other symptoms associated with anxiety.  Any significant life events: job concerns  Patient is feeling anxious or having panic attacks.  Patient has no concerns about alcohol or drug use.    He eats 0-1 servings of fruits and vegetables daily.He consumes 2 sweetened beverage(s) daily.He exercises with enough effort to increase his heart rate 9 or less minutes per day.  He exercises with enough effort to increase his heart rate 3 or less days per week.   He is taking medications regularly.  Today's KALI-7 Score: 6     Patient Active Problem List   Diagnosis     Benign neoplasm of skin of trunk, except scrotum     CARDIOVASCULAR SCREENING; LDL GOAL LESS THAN 130     ADD (attention deficit disorder with hyperactivity)     Current Outpatient Medications   Medication Sig Dispense Refill             hydrOXYzine (ATARAX) 25 MG tablet Take 1 tablet  (25 mg) by mouth 3 times daily as needed for anxiety 60 tablet 1     NO ACTIVE MEDICATIONS   0            Review of Systems         Objective    /80 (BP Location: Right arm, Patient Position: Sitting, Cuff Size: Adult Regular)   Pulse 70   Temp 98.4  F (36.9  C) (Tympanic)   Resp 16   Wt 94.9 kg (209 lb 3.2 oz)   SpO2 97%   BMI 27.43 kg/m    Body mass index is 27.43 kg/m .  Physical Exam   GENERAL: healthy, alert and no distress  MS: no gross musculoskeletal defects noted, no edema  PSYCH: mentation appears normal, affect normal/bright

## 2022-12-08 NOTE — PATIENT INSTRUCTIONS
Start gabapentin each evening  Continue hydroxyzine as needed for acute anxiety  Follow-up 3-4 weeks

## 2023-06-02 ENCOUNTER — HEALTH MAINTENANCE LETTER (OUTPATIENT)
Age: 33
End: 2023-06-02

## 2023-10-06 ENCOUNTER — E-VISIT (OUTPATIENT)
Dept: URGENT CARE | Facility: CLINIC | Age: 33
End: 2023-10-06
Payer: COMMERCIAL

## 2023-10-06 DIAGNOSIS — R21 RASH: Primary | ICD-10-CM

## 2023-10-06 PROCEDURE — 99207 PR NON-BILLABLE SERV PER CHARTING: CPT | Performed by: NURSE PRACTITIONER

## 2023-10-07 ENCOUNTER — OFFICE VISIT (OUTPATIENT)
Dept: URGENT CARE | Facility: URGENT CARE | Age: 33
End: 2023-10-07
Payer: COMMERCIAL

## 2023-10-07 VITALS
BODY MASS INDEX: 27.48 KG/M2 | TEMPERATURE: 98.5 F | DIASTOLIC BLOOD PRESSURE: 82 MMHG | RESPIRATION RATE: 16 BRPM | WEIGHT: 209.56 LBS | OXYGEN SATURATION: 99 % | HEART RATE: 74 BPM | SYSTOLIC BLOOD PRESSURE: 129 MMHG

## 2023-10-07 DIAGNOSIS — B02.9 HERPES ZOSTER WITHOUT COMPLICATION: Primary | ICD-10-CM

## 2023-10-07 PROCEDURE — 99213 OFFICE O/P EST LOW 20 MIN: CPT | Performed by: PHYSICIAN ASSISTANT

## 2023-10-07 RX ORDER — VALACYCLOVIR HYDROCHLORIDE 1 G/1
1000 TABLET, FILM COATED ORAL 3 TIMES DAILY
Qty: 21 TABLET | Refills: 0 | Status: SHIPPED | OUTPATIENT
Start: 2023-10-07 | End: 2024-09-27

## 2023-10-07 RX ORDER — PREDNISONE 20 MG/1
40 TABLET ORAL DAILY
Qty: 14 TABLET | Refills: 0 | Status: SHIPPED | OUTPATIENT
Start: 2023-10-07 | End: 2023-10-14

## 2023-10-07 RX ORDER — HYDROCODONE BITARTRATE AND ACETAMINOPHEN 5; 325 MG/1; MG/1
1 TABLET ORAL EVERY 6 HOURS PRN
Qty: 12 TABLET | Refills: 0 | Status: SHIPPED | OUTPATIENT
Start: 2023-10-07 | End: 2023-10-10

## 2023-10-07 ASSESSMENT — ENCOUNTER SYMPTOMS
SHORTNESS OF BREATH: 0
NECK STIFFNESS: 0
NECK PAIN: 0
COLOR CHANGE: 1
BACK PAIN: 1
CARDIOVASCULAR NEGATIVE: 1
WOUND: 0
FEVER: 0
FATIGUE: 0
ARTHRALGIAS: 1
CHEST TIGHTNESS: 0
SORE THROAT: 0
RESPIRATORY NEGATIVE: 1
MYALGIAS: 1
PALPITATIONS: 0
COUGH: 0
CHILLS: 0
JOINT SWELLING: 0
WHEEZING: 0
RHINORRHEA: 0

## 2023-10-07 NOTE — PATIENT INSTRUCTIONS
Dear Claudy Angulo,    We are sorry you are not feeling well. Based on the responses you provided, it is recommended that you be seen in-person in urgent care so we can better evaluate your symptoms. Please click here to find the nearest urgent care location to you.   You will not be charged for this Visit. Thank you for trusting us with your care.    Nell Angeles, CNP

## 2023-10-07 NOTE — PROGRESS NOTES
Gilson Loco is a 33 year old, presenting for the following health issues:  Urgent Care (Urgent care visit for rash on back and legs.) and Derm Problem (Rash on right inner thigh and outer aspect of Rt knee and on lower back. Red patchy large lesions. Very sensitive to touch and painful.  Pain radiates from legs to back. He was flying to [place] this past Thursday he felt irritation on his lower back and leg. He thought it was his clothes. Pain started on Monday and he did not notice the rash until a day or two ago. )    HPI   Rash  Onset/Duration: 5days  Description  Location: low back, R leg  Character: raised, painful, burning, red, spots  Itching: no  Intensity:  moderate  Progression of Symptoms:  worsening  Accompanying signs and symptoms:   Fever: No  Body aches or joint pain: No  Sore throat symptoms: No  Recent cold symptoms: No  History:           Previous episodes of similar rash: None  New exposures:  soap, detergent from the hotel  Recent travel: YES- went to [facility] recently  Exposure to similar rash: No  Precipitating or alleviating factors: He thought it was due to his clothes rubbing up against his skin  Therapies tried and outcome: changing his clothes with minimal relief    Patient Active Problem List   Diagnosis    Benign neoplasm of skin of trunk, except scrotum    CARDIOVASCULAR SCREENING; LDL GOAL LESS THAN 130    ADD (attention deficit disorder with hyperactivity)     Current Outpatient Medications   Medication    gabapentin (NEURONTIN) 100 MG capsule    hydrOXYzine (ATARAX) 25 MG tablet    NO ACTIVE MEDICATIONS     No current facility-administered medications for this visit.        Allergies   Allergen Reactions    No Known Drug Allergy      Review of Systems   Constitutional:  Negative for chills, fatigue and fever.   HENT: Negative.  Negative for congestion, ear pain, rhinorrhea and sore throat.    Respiratory: Negative.  Negative for cough, chest tightness, shortness of breath and  wheezing.    Cardiovascular: Negative.  Negative for chest pain, palpitations and peripheral edema.   Musculoskeletal:  Positive for arthralgias, back pain and myalgias. Negative for gait problem, joint swelling, neck pain and neck stiffness.   Skin:  Positive for color change and rash. Negative for pallor and wound.   All other systems reviewed and are negative.           Objective    /82 (BP Location: Left arm, Patient Position: Sitting, Cuff Size: Adult Large)   Pulse 74   Temp 98.5  F (36.9  C) (Oral)   Resp 16   Wt 95.1 kg (209 lb 9 oz)   SpO2 99%   BMI 27.48 kg/m    Body mass index is 27.48 kg/m .  Physical Exam  Vitals and nursing note reviewed.   Constitutional:       General: He is not in acute distress.     Appearance: Normal appearance. He is well-developed and normal weight. He is not ill-appearing.   Skin:     General: Skin is warm and dry.      Findings: Erythema and rash (clustered groups of vesicles present along dermatomal pattern L2.  mild tenderness but no drainage) present. No abrasion, abscess, burn, ecchymosis, signs of injury, laceration or wound. Rash is vesicular. Rash is not crusting, macular, nodular, papular, purpuric, pustular, scaling or urticarial.   Neurological:      Mental Status: He is alert and oriented to person, place, and time.   Psychiatric:         Mood and Affect: Mood normal.         Behavior: Behavior normal.         Thought Content: Thought content normal.         Judgment: Judgment normal.          Assessment/Plan:  Herpes zoster without complication:  Will start valacyclovir, [id number] and norco as needed for pain.  Discussed risks and benefits of medication along with side effects, direction for use.  No driving or operating machinery due to sedation.  Educated patient on pathophysiology, course and complications of shingles.  He is considered contagious until all lesions have crusted over.  Keep cover and avoid direct contact with lesions.   Recheck  in clinic if symptoms worsen or if symptoms do not improve.     -     valACYclovir (VALTREX) 1000 mg tablet; Take 1 tablet (1,000 mg) by mouth 3 times daily for 7 days  -     predniSONE (DELTASONE) 20 MG tablet; Take 2 tablets (40 mg) by mouth daily for 7 days  -     HYDROcodone-acetaminophen (NORCO) 5-325 MG tablet; Take 1 tablet by mouth every 6 hours as needed for moderate pain        Karis See JAKE Davison

## 2023-10-09 ENCOUNTER — VIRTUAL VISIT (OUTPATIENT)
Dept: PEDIATRICS | Facility: CLINIC | Age: 33
End: 2023-10-09
Payer: COMMERCIAL

## 2023-10-09 DIAGNOSIS — B02.9 HERPES ZOSTER WITHOUT COMPLICATION: Primary | ICD-10-CM

## 2023-10-09 PROCEDURE — 99213 OFFICE O/P EST LOW 20 MIN: CPT | Mod: VID | Performed by: INTERNAL MEDICINE

## 2023-10-09 RX ORDER — GABAPENTIN 100 MG/1
CAPSULE ORAL
Qty: 90 CAPSULE | Refills: 0 | Status: SHIPPED | OUTPATIENT
Start: 2023-10-09 | End: 2024-09-27

## 2023-10-09 NOTE — PROGRESS NOTES
Claudy is a 33 year old who is being evaluated via a billable video visit.      Assessment & Plan     (B02.9) Herpes zoster without complication  (primary encounter diagnosis)  Comment:   Pain secondary to herpes zoster.  Recommended completing course of valacyclovir.  Hydrocodone as needed for more severe pain start gabapentin 100 mg each evening and titrate to 300 mg each evening if needed.  Given information with regard to herpes zoster/see AVS  Plan: gabapentin (NEURONTIN) 100 MG capsule            Antelmo Schneider MD  [facility] KAVITHA Loco is a 33 year old, presenting for the following health issues:  HPI     Follow-up of recent urgent care visit for herpes zoster.  Discharged on valacyclovir, prednisone and hydrocodone as needed.  Having significant ongoing discomfort and rash has extended a bit-present on his lower back and right inner thigh.  As both crusted lesions and new blisters forming.  Describes leg pain when getting up from a sitting position and when going up and down stairs.    Review of Systems         Objective           Vitals:  No vitals were obtained today due to virtual visit.    Physical Exam   GENERAL: Healthy, alert and no distress  EYES: Eyes grossly normal to inspection.  No discharge or erythema, or obvious scleral/conjunctival abnormalities.  RESP: No audible wheeze, cough, or visible cyanosis.  No visible retractions or increased work of breathing.   NEURO: Cranial nerves grossly intact.  Mentation and speech appropriate for age.  PSYCH: Mentation appears normal, affect normal/bright, judgement and insight intact, normal speech and appearance well-groomed.                Video-Visit Details    Type of service:  Video Visit     Originating Location (pt. Location): Home    Distant Location (provider location):  On-site  Platform used for Video Visit: [employer]  Video visit start: 5:00pm  Video visit end:  5:11pm

## 2024-06-22 ENCOUNTER — HEALTH MAINTENANCE LETTER (OUTPATIENT)
Age: 34
End: 2024-06-22

## 2024-09-27 ENCOUNTER — VIRTUAL VISIT (OUTPATIENT)
Dept: INTERNAL MEDICINE | Facility: CLINIC | Age: 34
End: 2024-09-27
Payer: COMMERCIAL

## 2024-09-27 DIAGNOSIS — L03.012 PARONYCHIA OF FINGER, LEFT: Primary | ICD-10-CM

## 2024-09-27 PROCEDURE — 99214 OFFICE O/P EST MOD 30 MIN: CPT | Mod: 95 | Performed by: INTERNAL MEDICINE

## 2024-09-27 PROCEDURE — G2211 COMPLEX E/M VISIT ADD ON: HCPCS | Mod: 95 | Performed by: INTERNAL MEDICINE

## 2024-09-27 RX ORDER — MUPIROCIN 20 MG/G
OINTMENT TOPICAL 3 TIMES DAILY
Qty: 15 G | Refills: 1 | Status: SHIPPED | OUTPATIENT
Start: 2024-09-27

## 2024-09-27 RX ORDER — TRIAMCINOLONE ACETONIDE 1 MG/G
CREAM TOPICAL 2 TIMES DAILY
Qty: 15 G | Refills: 1 | Status: SHIPPED | OUTPATIENT
Start: 2024-09-27

## 2024-09-27 NOTE — PATIENT INSTRUCTIONS
Discontinue bacitracin    Mupirocin ointment 3 times daily for 1 week    Triamcinolone cream twice daily for 1 week    If no improvement after 3 to 4 days cephalexin 3 times daily for 5 days    MyChart communication initiated    Medicine list updated    Risks and benefits of shingles vaccine, and a 34-year-old with a history of chickenpox and shingles already discussed

## 2024-09-27 NOTE — PROGRESS NOTES
Claudy is a 34 year old who is being evaluated via a billable video visit.    How would you like to obtain your AVS? MyChart  If the video visit is dropped, the invitation should be resent by: Text to cell phone: [phone]  Will anyone else be joining your video visit? No  {If patient encounters technical issues they should call [phone] :[id number]}    {PROVIDER CHARTING PREFERENCE:409088}    Subjective   Claudy is a 34 year old, presenting for the following health issues:  office visit and Recheck Medication (Pt reports that he is having this visit to discuss left index finger possibly being infected.)      9/27/2024    11:58 AM   Additional Questions   Roomed by zoraida   Accompanied by alone         9/27/2024    11:58 AM   Patient Reported Additional Medications   Patient reports taking the following new medications none     History of Present Illness       Reason for visit:  Possible left finger infection   He is taking medications regularly.       {SUPERLIST (Optional):032020}  {additonal problems for provider to add (Optional):459785}    {ROS Picklists (Optional):712094}      Objective           Vitals:  No vitals were obtained today due to virtual visit.    Physical Exam   {video visit exam brief selected:020201}    {Diagnostic Test Results (Optional):613017}      Video-Visit Details    Type of service:  Video Visit   Originating Location (pt. Location): Home  {PROVIDER LOCATION On-site should be selected for visits conducted from your clinic location or adjoining [facility] hospital, academic office, or other nearby [facility] building. Off-site should be selected for all other provider locations, including home:652107}  Distant Location (provider location):  Off-site  Platform used for Video Visit: [employer]  Signed Electronically by: Martin Porter MD  {Email feedback regarding this note to [email or web address]   :110906}

## 2024-09-27 NOTE — PROGRESS NOTES
OFFICE VISIT--Video    Claudy is a 34 year old male contacting the clinic today via video, who will use the platform: [employer] for the visit.  Phone # for [employer], or if [employer] drops:   Telephone Information:   Mobile [phone]          ASSESSMENT and PLAN:  1. Paronychia of finger, left  Improving after incision and drainage.  Provide topical treatment for inflammation and infection.  If no improvement in 48 to 72 hours moved to systemic antibiotics  - mupirocin (BACTROBAN) 2 % external ointment; Apply topically 3 times daily.  Dispense: 15 g; Refill: 1  - triamcinolone (KENALOG) 0.1 % external cream; Apply topically 2 times daily.  Dispense: 15 g; Refill: 1  - cephALEXin (KEFLEX) 250 MG capsule; Take 1 capsule (250 mg) by mouth 3 times daily for 5 days.  Dispense: 15 capsule; Refill: 0    2.  History of shingles.  Recommend verify chickenpox versus chickenpox vaccine.  Shingles as illness would provide some immunity but duration unclear.  Proceed with plans for Shingrix     Patient Instructions   Discontinue bacitracin    Mupirocin ointment 3 times daily for 1 week    Triamcinolone cream twice daily for 1 week    If no improvement after 3 to 4 days cephalexin 3 times daily for 5 days    MyChart communication initiated    Medicine list updated    Risks and benefits of shingles vaccine, and a 34-year-old with a history of chickenpox and shingles already discussed            Return if symptoms worsen or fail to improve, for using a video visit.       CHIEF COMPLAINT:  Chief Complaint   Patient presents with    office visit    Recheck Medication     Pt reports that he is having this visit to discuss left index finger possibly being infected.       HISTORY OF PRESENT ILLNESS:  Claudy is a 34 year old male contacting the clinic today via video for complaints of persisting paronychia of left index finger.  Went to emergency room on September 21 where this was lanced.  No prescriptions given but is using over-the-counter  "bacitracin.  Definitely improved but persisting erythema proximally and slight pus with pressure.  Similar paronychia of toe much better    Discussed infection versus inflammation.  Discussed options.  Will add topical antibiotics and anti-inflammatory.  If no improvement moved to oral    Believes that he had chickenpox as a youngster and did not have the chickenpox vaccine.  October 2023 had shingles which was very painful.  Discussed risks and benefits and timing of Shingrix vaccine and recommended to proceed    History of Present Illness       Reason for visit:  Possible left finger infection   He is taking medications regularly.      REVIEW OF SYSTEMS:   Otherwise healthy    Today's PHQ-2 Score:       9/27/2024    12:00 PM   PHQ-2 ( 1999 [employer])   Q1: Little interest or pleasure in doing things 0   Q2: Feeling down, depressed or hopeless 0   PHQ-2 Score 0       PFSH:  Social History     Social History Narrative    Not on file      and lives with his wife    TOBACCO USE:  History   Smoking Status    Never   Smokeless Tobacco    Current    Types: Chew       VITALS:  There were no vitals filed for this visit.  There were no vitals taken for this visit. Estimated body mass index is 27.48 kg/m  as calculated from the following:    Height as of 2/18/21: 1.86 m (6' 1.23\").    Weight as of 10/7/23: 95.1 kg (209 lb 9 oz).    PHYSICAL EXAM:  (observations via Video)  Left index finger nail split.  Slight amount of purulence.  Proximal erythema approximately 1 cm.  Excoriation and irritation of medial nailbed    MEDICATIONS:   Current Outpatient Medications   Medication Sig Dispense Refill    cephALEXin (KEFLEX) 250 MG capsule Take 1 capsule (250 mg) by mouth 3 times daily for 5 days. 15 capsule 0    mupirocin (BACTROBAN) 2 % external ointment Apply topically 3 times daily. 15 g 1    triamcinolone (KENALOG) 0.1 % external cream Apply topically 2 times daily. 15 g 1       Outside Notes summarized: Emergency room " "after obtaining permission through [facility]  Labs, x-rays, cardiology, GI tests reviewed:   No results for input(s): \"HGB\", \"WBC\", \"NA\", \"POTASSIUM\", \"CR\", \"A1C\", \"PSA\", \"URIC\", \"B12\", \"TSH\", \"VITDT\", \"SED\", \"CRPI\" in the last 42867 hours.  Lab Results   Component Value Date    [id number] Negative 09/02/2022     No results found for: \"CHOL\"  New orders: No orders of the defined types were placed in this encounter.      Independent review of:   Patient would like to receive their AVS by [employer]    Video-Visit Details  Type of service:  Video Visit      9/27/2024    11:58 AM   Additional Questions   Roomed by zoraida   Accompanied by tiesha         9/27/2024    11:58 AM   Patient Reported Additional Medications   Patient reports taking the following new medications none     Patient has given verbal consent to a Video visit?  Yes  How would you like to obtain your AVS?  [employer]  Will anyone else be joining your video visit, giving supplemental history? No    Originating location (pt location): Home    Distant Location (provider location):  Off-site    Video Start Time: 12:04 PM  Video End time:  12:34 PM  Face to face plus orders: 30 minutes  Documentation time:  3 minutes     The visit lasted a total of 33 minutes    Martin Porter MD  Internal Medicine  [facility]     "

## 2025-07-12 ENCOUNTER — HEALTH MAINTENANCE LETTER (OUTPATIENT)
Age: 35
End: 2025-07-12